# Patient Record
Sex: MALE | Race: WHITE | NOT HISPANIC OR LATINO | Employment: UNEMPLOYED | URBAN - METROPOLITAN AREA
[De-identification: names, ages, dates, MRNs, and addresses within clinical notes are randomized per-mention and may not be internally consistent; named-entity substitution may affect disease eponyms.]

---
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
## 2019-03-29 DIAGNOSIS — Z00.00 WELL ADULT EXAM: Primary | ICD-10-CM

## 2019-04-04 ENCOUNTER — RESULTS ENCOUNTER (OUTPATIENT)
Dept: FAMILY MEDICINE CLINIC | Facility: CLINIC | Age: 19
End: 2019-04-04

## 2019-04-04 DIAGNOSIS — Z00.00 WELL ADULT EXAM: ICD-10-CM

## 2019-04-06 LAB
25(OH)D3+25(OH)D2 SERPL-MCNC: 45.7 NG/ML (ref 30–100)
ALBUMIN SERPL-MCNC: 4.8 G/DL (ref 3.5–5.2)
ALBUMIN/GLOB SERPL: 2.5 G/DL
ALP SERPL-CCNC: 99 U/L (ref 39–117)
ALT SERPL-CCNC: 70 U/L (ref 1–41)
APPEARANCE UR: CLEAR
AST SERPL-CCNC: 32 U/L (ref 1–40)
BACTERIA #/AREA URNS HPF: NORMAL /HPF
BASOPHILS # BLD AUTO: 0.05 10*3/MM3 (ref 0–0.2)
BASOPHILS NFR BLD AUTO: 0.7 % (ref 0–1.5)
BILIRUB SERPL-MCNC: 0.7 MG/DL (ref 0.2–1.2)
BILIRUB UR QL STRIP: NEGATIVE
BUN SERPL-MCNC: 12 MG/DL (ref 6–20)
BUN/CREAT SERPL: 15 (ref 7–25)
CALCIUM SERPL-MCNC: 9.7 MG/DL (ref 8.6–10.5)
CHLORIDE SERPL-SCNC: 104 MMOL/L (ref 98–107)
CHOLEST SERPL-MCNC: 123 MG/DL (ref 0–200)
CO2 SERPL-SCNC: 26.8 MMOL/L (ref 22–29)
COLOR UR: YELLOW
CREAT SERPL-MCNC: 0.8 MG/DL (ref 0.76–1.27)
EOSINOPHIL # BLD AUTO: 0.58 10*3/MM3 (ref 0–0.4)
EOSINOPHIL NFR BLD AUTO: 8.5 % (ref 0.3–6.2)
EPI CELLS #/AREA URNS HPF: NORMAL /HPF
ERYTHROCYTE [DISTWIDTH] IN BLOOD BY AUTOMATED COUNT: 12.5 % (ref 12.3–15.4)
GLOBULIN SER CALC-MCNC: 1.9 GM/DL
GLUCOSE SERPL-MCNC: 87 MG/DL (ref 65–99)
GLUCOSE UR QL: NEGATIVE
HCT VFR BLD AUTO: 49.1 % (ref 37.5–51)
HDLC SERPL-MCNC: 42 MG/DL (ref 40–60)
HGB BLD-MCNC: 16.7 G/DL (ref 13–17.7)
HGB UR QL STRIP: NEGATIVE
IMM GRANULOCYTES # BLD AUTO: 0.08 10*3/MM3 (ref 0–0.05)
IMM GRANULOCYTES NFR BLD AUTO: 1.2 % (ref 0–0.5)
KETONES UR QL STRIP: NEGATIVE
LDLC SERPL CALC-MCNC: 54 MG/DL (ref 0–100)
LDLC/HDLC SERPL: 1.29 {RATIO}
LEUKOCYTE ESTERASE UR QL STRIP: NEGATIVE
LYMPHOCYTES # BLD AUTO: 2.36 10*3/MM3 (ref 0.7–3.1)
LYMPHOCYTES NFR BLD AUTO: 34.5 % (ref 19.6–45.3)
MCH RBC QN AUTO: 29.7 PG (ref 26.6–33)
MCHC RBC AUTO-ENTMCNC: 34 G/DL (ref 31.5–35.7)
MCV RBC AUTO: 87.2 FL (ref 79–97)
MICRO URNS: ABNORMAL
MICRO URNS: ABNORMAL
MONOCYTES # BLD AUTO: 0.68 10*3/MM3 (ref 0.1–0.9)
MONOCYTES NFR BLD AUTO: 9.9 % (ref 5–12)
MUCOUS THREADS URNS QL MICRO: PRESENT /HPF
NEUTROPHILS # BLD AUTO: 3.1 10*3/MM3 (ref 1.4–7)
NEUTROPHILS NFR BLD AUTO: 45.2 % (ref 42.7–76)
NITRITE UR QL STRIP: NEGATIVE
NRBC BLD AUTO-RTO: 0.1 /100 WBC (ref 0–0)
PH UR STRIP: 6.5 [PH] (ref 5–7.5)
PLATELET # BLD AUTO: 281 10*3/MM3 (ref 140–450)
POTASSIUM SERPL-SCNC: 4.4 MMOL/L (ref 3.5–5.2)
PROT SERPL-MCNC: 6.7 G/DL (ref 6–8.5)
PROT UR QL STRIP: NEGATIVE
RBC # BLD AUTO: 5.63 10*6/MM3 (ref 4.14–5.8)
RBC #/AREA URNS HPF: NORMAL /HPF
SODIUM SERPL-SCNC: 142 MMOL/L (ref 136–145)
SP GR UR: >=1.03 (ref 1–1.03)
T4 FREE SERPL-MCNC: 1.13 NG/DL (ref 0.93–1.7)
TRIGL SERPL-MCNC: 135 MG/DL (ref 0–150)
TSH SERPL DL<=0.005 MIU/L-ACNC: 0.7 MIU/ML (ref 0.27–4.2)
URINALYSIS REFLEX: ABNORMAL
UROBILINOGEN UR STRIP-MCNC: 0.2 MG/DL (ref 0.2–1)
VLDLC SERPL CALC-MCNC: 27 MG/DL
WBC # BLD AUTO: 6.85 10*3/MM3 (ref 3.4–10.8)
WBC #/AREA URNS HPF: NORMAL /HPF

## 2019-04-12 ENCOUNTER — OFFICE VISIT (OUTPATIENT)
Dept: FAMILY MEDICINE CLINIC | Facility: CLINIC | Age: 19
End: 2019-04-12

## 2019-04-12 VITALS
WEIGHT: 187.9 LBS | BODY MASS INDEX: 27.83 KG/M2 | TEMPERATURE: 97.9 F | DIASTOLIC BLOOD PRESSURE: 64 MMHG | SYSTOLIC BLOOD PRESSURE: 120 MMHG | HEIGHT: 69 IN | OXYGEN SATURATION: 98 % | HEART RATE: 87 BPM

## 2019-04-12 DIAGNOSIS — Z00.00 WELL ADULT EXAM: ICD-10-CM

## 2019-04-12 DIAGNOSIS — K21.9 GASTROESOPHAGEAL REFLUX DISEASE, ESOPHAGITIS PRESENCE NOT SPECIFIED: ICD-10-CM

## 2019-04-12 DIAGNOSIS — R74.8 ELEVATED LIVER ENZYMES: Primary | ICD-10-CM

## 2019-04-12 PROCEDURE — 99395 PREV VISIT EST AGE 18-39: CPT | Performed by: INTERNAL MEDICINE

## 2019-04-12 RX ORDER — RANITIDINE 150 MG/1
150 CAPSULE ORAL EVERY EVENING
Qty: 30 CAPSULE | Refills: 11 | Status: SHIPPED | OUTPATIENT
Start: 2019-04-12 | End: 2020-03-25

## 2019-04-12 RX ORDER — CETIRIZINE HYDROCHLORIDE 10 MG/1
10 TABLET ORAL DAILY
COMMUNITY
End: 2020-03-25

## 2019-04-12 NOTE — PROGRESS NOTES
Subjective   Rik Rubin is a 18 y.o. male who comes in today for   Chief Complaint   Patient presents with   • Annual Exam   .    History of Present Illness   Here for CPE with mom.  Taking omeprazole daily and without it has flares of GERD.  First started around age 11 and initially tried zantac but hasn't lately.  Sometimes foods flare him.  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  Taking zinc and copper supplement to keep him well and not sick.    Mom has him taking  multivitamin as well as 1200 international units of vitamin D3 per day.  He is not exercising.  He does occasionally walk with his mom.  The following portions of the patient's history were reviewed and updated as appropriate: allergies, current medications, past family history, past medical history, past social history, past surgical history and problem list.    Review of Systems   Constitutional: Negative.    Gastrointestinal: Negative for constipation and diarrhea.   Psychiatric/Behavioral: Negative.        Vitals:    04/12/19 1045   BP: 120/64   Pulse: 87   Temp: 97.9 °F (36.6 °C)   SpO2: 98%       Objective   Physical Exam   Constitutional: He is oriented to person, place, and time. He appears well-developed and well-nourished.   HENT:   Head: Normocephalic and atraumatic.   Right Ear: External ear normal.   Left Ear: External ear normal.   Mouth/Throat: Oropharynx is clear and moist.   Eyes: Conjunctivae are normal.   Neck: Neck supple.   Cardiovascular: Normal rate, regular rhythm and normal heart sounds.   No bruits   Pulmonary/Chest: Effort normal and breath sounds normal. No respiratory distress. He has no wheezes. He has no rales.   Abdominal: Soft. Bowel sounds are normal. He exhibits no distension and no mass. There is no tenderness.   Lymphadenopathy:     He has no cervical adenopathy.   Neurological: He is alert and oriented to person, place, and time.   Skin: Skin is warm. Capillary refill takes less than  2 seconds.   Psychiatric: He has a normal mood and affect. His behavior is normal. Judgment and thought content normal.   Nursing note and vitals reviewed.        Current Outpatient Medications:   •  cetirizine (zyrTEC) 10 MG tablet, Take 10 mg by mouth Daily., Disp: , Rfl:   •  Multiple Vitamins-Minerals (MULTIVITAMIN WITH MINERALS) tablet tablet, Take 1 tablet by mouth Daily., Disp: , Rfl:   •  Probiotic Product (PROBIOTIC + OMEGA-3 PO), Take  by mouth., Disp: , Rfl:   •  vitamin D (CHOLECALCIFEROL) 400 UNITS tablet, Take 400 Units by mouth 2 (Two) Times a Day., Disp: , Rfl:   •  ranitidine (ZANTAC) 150 MG capsule, Take 1 capsule by mouth Every Evening., Disp: 30 capsule, Rfl: 11    Assessment/Plan   Rik was seen today for annual exam.    Diagnoses and all orders for this visit:    Elevated liver enzymes  -     Comprehensive Metabolic Panel; Future    Well adult exam    Gastroesophageal reflux disease, esophagitis presence not specified    Other orders  -     ranitidine (ZANTAC) 150 MG capsule; Take 1 capsule by mouth Every Evening.    stop omeprazole and start zantac 150 mg qhs  Recheck CMP in 4 weeks to follow-up on the one elevated liver enzyme  I have asked him to stop his zinc combined with copper supplement based on his elevated liver enzyme.  He could take zinc that is within a multivitamin or a low dosing supplement itself.  We also discussed that her taking a daily probiotic is not necessary but it would not hurt him and is an  immune system booster.  Labs were reviewed with Rik as well as his mom in detail and of written copy was provided for them his vaccinations are up-to-date               I have asked for the patient to return to clinic in 12month(s).

## 2019-04-26 ENCOUNTER — TELEPHONE (OUTPATIENT)
Dept: FAMILY MEDICINE CLINIC | Facility: CLINIC | Age: 19
End: 2019-04-26

## 2019-04-26 DIAGNOSIS — N50.9 TESTICULAR ABNORMALITY: Primary | ICD-10-CM

## 2019-05-09 DIAGNOSIS — R74.8 ELEVATED LIVER ENZYMES: ICD-10-CM

## 2020-03-10 RX ORDER — ALBUTEROL SULFATE 90 UG/1
2 AEROSOL, METERED RESPIRATORY (INHALATION) EVERY 4 HOURS PRN
Qty: 18 G | Refills: 1 | Status: SHIPPED | OUTPATIENT
Start: 2020-03-10 | End: 2021-11-16 | Stop reason: SDUPTHER

## 2020-03-10 NOTE — TELEPHONE ENCOUNTER
We have never prescribed this for the patient last seen 04/20/19 Nxt appointment 04/22/20. Please advise

## 2020-03-10 NOTE — TELEPHONE ENCOUNTER
Patient's mother, Judy, called stating that the patient is in need of another inhaler. Ventolin (albuterol sulfate inhaler 90 MCG).     [facility] on [address] confirmed.     Patient call back: [phone]

## 2020-03-23 ENCOUNTER — TELEPHONE (OUTPATIENT)
Dept: FAMILY MEDICINE CLINIC | Facility: CLINIC | Age: 20
End: 2020-03-23

## 2020-03-23 NOTE — TELEPHONE ENCOUNTER
E visit would be helpful or a video ov even better.  Need to know more information about his cough?  Any other sx's? Productive or dry?  Fever or no fever?

## 2020-03-23 NOTE — TELEPHONE ENCOUNTER
PT MOTHER STATED THAT PT HAS HAS A COUGH SINCE EITHER FEB 24TH OR 25TH. PT MOTHER STATED THAT PT HAS ASTHMA AND MOTHER REQUESTED TO KNOW IF THERE WAS SOMETHING THAT COULD BE CALLED IN TO HELP REMEDY THIS. PT MOTHER REQUESTED TO KNOW IF AN ANTIBIOTIC NEEDS TO BE CALLED IN SINCE IT HAS BEEN SO LONG SINCE HIS COUGH STARTED.    PLEASE ADVISE [phone]

## 2020-03-24 ENCOUNTER — E-VISIT (OUTPATIENT)
Dept: FAMILY MEDICINE CLINIC | Facility: CLINIC | Age: 20
End: 2020-03-24

## 2020-03-24 DIAGNOSIS — R05.9 COUGH: Primary | ICD-10-CM

## 2020-03-24 NOTE — TELEPHONE ENCOUNTER
Mom is calling back today. Cough is very dry, it is a very deep cough like when someone has bronchitis. No fever as of right now but does have chills and heat sweats on and off at night.

## 2020-03-25 ENCOUNTER — TELEMEDICINE (OUTPATIENT)
Dept: FAMILY MEDICINE CLINIC | Facility: CLINIC | Age: 20
End: 2020-03-25

## 2020-03-25 DIAGNOSIS — J45.20 MILD INTERMITTENT REACTIVE AIRWAY DISEASE WITHOUT COMPLICATION: ICD-10-CM

## 2020-03-25 DIAGNOSIS — R05.9 COUGH: Primary | ICD-10-CM

## 2020-03-25 DIAGNOSIS — J06.9 ACUTE URI: ICD-10-CM

## 2020-03-25 PROCEDURE — 99213 OFFICE O/P EST LOW 20 MIN: CPT | Performed by: INTERNAL MEDICINE

## 2020-03-25 RX ORDER — LORATADINE 10 MG/1
2 TABLET ORAL DAILY
COMMUNITY
Start: 2020-03-14

## 2020-03-25 RX ORDER — ALBUTEROL SULFATE 0.63 MG/3ML
1 SOLUTION RESPIRATORY (INHALATION) EVERY 6 HOURS PRN
Qty: 3 ML | Refills: 2 | Status: SHIPPED | OUTPATIENT
Start: 2020-03-25

## 2020-03-25 RX ORDER — BENZONATATE 200 MG/1
200 CAPSULE ORAL 3 TIMES DAILY PRN
Qty: 45 CAPSULE | Refills: 0 | Status: SHIPPED | OUTPATIENT
Start: 2020-03-25 | End: 2020-07-28

## 2020-03-25 RX ORDER — AZITHROMYCIN 250 MG/1
TABLET, FILM COATED ORAL
Qty: 6 TABLET | Refills: 0 | Status: SHIPPED | OUTPATIENT
Start: 2020-03-25 | End: 2020-07-28

## 2020-03-25 RX ORDER — FLUTICASONE PROPIONATE 50 MCG
1 SPRAY, SUSPENSION (ML) NASAL DAILY
COMMUNITY
Start: 2020-03-14

## 2020-03-25 NOTE — PROGRESS NOTES
Subjective   Rik Rubin is a 19 y.o. male who comes in today for No chief complaint on file.  .    History of Present Illness   Patient o he is taking an allergy medication.  His cough seems to worsen once he is been up for a while.  He is wondering about a nebulizer treatment.  N for video visit.  He had a cough upper respiratory illness on February 24.  He seemed to get better but then worsened on March 14.  The following day he went to the [facility] and received Bromfed, steroid Dosepak, Tessalon Perles, and pro-air.  This Tessalon Perles seem to help him.  He remains with a cough which is mostly dry although he can feel some congestion in his nose and throat area.  No color to it.  No fever.  His cough is nonproductive.  He does have history of reactive airway disease and significant seasonal allergies for which she is seen an allergist on 2 different occasions.  The following portions of the patient's history were reviewed and updated as appropriate: allergies, current medications, past family history, past medical history, past social history, past surgical history and problem list.    Review of Systems    There were no vitals taken for this visit.    [facility] Fall Risk Assessment has not been completed.    PHQ-2/PHQ-9 Depression Screening 8/1/2017   Little interest or pleasure in doing things 0   Feeling down, depressed, or hopeless 0   Total Score 0       Objective   Physical Exam      Current Outpatient Medications:   •  albuterol (ACCUNEB) 0.63 MG/3ML nebulizer solution, Take 3 mL by nebulization Every 6 (Six) Hours As Needed for Wheezing. 1 month supply, Disp: 3 mL, Rfl: 2  •  albuterol sulfate  (90 Base) MCG/ACT inhaler, Inhale 2 puffs Every 4 (Four) Hours As Needed for Wheezing., Disp: 18 g, Rfl: 1  •  Ascorbic Acid Buffered (BUFFERED VITAMIN C) 1000 MG capsule, Take 1 tablet by mouth Daily., Disp: , Rfl:   •  azithromycin (Zithromax Z-Jim) 250 MG tablet, Take 2 tablets the first day, then 1  tablet daily for 4 days., Disp: 6 tablet, Rfl: 0  •  benzonatate (TESSALON) 200 MG capsule, Take 1 capsule by mouth 3 (Three) Times a Day As Needed for Cough., Disp: 45 capsule, Rfl: 0  •  Cholecalciferol (VITAMIN D3 PO), Take 1,400 Units by mouth Daily., Disp: , Rfl:   •  fluticasone (FLONASE) 50 MCG/ACT nasal spray, 1 spray by Each Nare route Daily., Disp: , Rfl:   •  fluticasone-salmeterol (Advair Diskus) 250-50 MCG/DOSE DISKUS, Inhale 1 puff 2 (Two) Times a Day., Disp: 60 each, Rfl: 1  •  loratadine (Claritin) 10 MG tablet, Take 2 tablets by mouth Daily., Disp: , Rfl:   •  OMEPRAZOLE PO, Take 20 mg by mouth Daily., Disp: , Rfl:   •  Zinc 22.5 MG tablet, Take 1 tablet by mouth Every Other Day., Disp: , Rfl:     Assessment/Plan   Diagnoses and all orders for this visit:    Cough    Acute URI    Mild intermittent reactive airway disease without complication    Other orders  -     azithromycin (Zithromax Z-Jim) 250 MG tablet; Take 2 tablets the first day, then 1 tablet daily for 4 days.  -     benzonatate (TESSALON) 200 MG capsule; Take 1 capsule by mouth 3 (Three) Times a Day As Needed for Cough.  -     albuterol (ACCUNEB) 0.63 MG/3ML nebulizer solution; Take 3 mL by nebulization Every 6 (Six) Hours As Needed for Wheezing. 1 month supply  -     fluticasone-salmeterol (Advair Diskus) 250-50 MCG/DOSE DISKUS; Inhale 1 puff 2 (Two) Times a Day.      Patient sounds like he is having a reactive airway disease flare from either a viral upper respiratory infection or potentially a secondary bacterial upper respiratory infection that followed his initial illness in late February.  I am going to go ahead and start Zithromax on him as this is worked very well for him in the past and he does have a penicillin sensitivity.  I have refilled his or reordered his Tessalon Perles which seem to be helping him.  We are going to supply him with a nebulizer and send the order to his insurance company.  For payment.  The nebulizer will  be to use only if needed and if he gets into a coughing spasm which tends to happen on occasion.  I am also going to start him on Advair Diskus 250/50 1 puff twice daily and he has to rinse his mouth out after each use.  His nebulizer or the pro-air inhaler will be used only as needed for rescue and he is not to combine the 2 unless he is in severe distress.  Mom and patient are aware of this.  Total time spent with patient was 15 minutes.             I have asked for the patient to return to clinic in 6day(s).

## 2020-03-25 NOTE — PROGRESS NOTES
Subjective   Rik Rubin is a 19 y.o. male.     History of Present Illness   Pt has a cough for a few weeks.  This needs to be a video visit encounter.  We are calling him and setting up telehealth visit.    The following portions of the patient's history were reviewed and updated as appropriate: allergies, current medications, past family history, past medical history, past social history, past surgical history and problem list.    Review of Systems    Objective   Physical Exam      Assessment/Plan   Diagnoses and all orders for this visit:    Cough

## 2020-07-28 ENCOUNTER — OFFICE VISIT (OUTPATIENT)
Dept: FAMILY MEDICINE CLINIC | Facility: CLINIC | Age: 20
End: 2020-07-28

## 2020-07-28 VITALS
HEIGHT: 68 IN | BODY MASS INDEX: 31.16 KG/M2 | WEIGHT: 205.6 LBS | HEART RATE: 95 BPM | DIASTOLIC BLOOD PRESSURE: 66 MMHG | SYSTOLIC BLOOD PRESSURE: 122 MMHG | OXYGEN SATURATION: 98 % | TEMPERATURE: 98.1 F | RESPIRATION RATE: 16 BRPM

## 2020-07-28 DIAGNOSIS — Z00.00 WELL ADULT EXAM: Primary | ICD-10-CM

## 2020-07-28 DIAGNOSIS — R74.8 ELEVATED LIVER ENZYMES: ICD-10-CM

## 2020-07-28 PROCEDURE — 99395 PREV VISIT EST AGE 18-39: CPT | Performed by: INTERNAL MEDICINE

## 2020-07-28 RX ORDER — NICOTINE POLACRILEX 4 MG/1
20 GUM, CHEWING ORAL DAILY
Qty: 90 EACH | Refills: 1 | Status: SHIPPED | OUTPATIENT
Start: 2020-07-28

## 2020-07-28 NOTE — PROGRESS NOTES
"Subjective   Rik Rubin is a 20 y.o. male who comes in today for   Chief Complaint   Patient presents with   • Annual Exam     cpe    .    History of Present Illness   Here for CPE.  He is finishing up an associates degree at [employer].  Living at home and not working currently.  Feeling well.  No concerns per patient.  No stomach issues.  No constipation.  No urinary issues.  No cp or soa.  Uses rescue inhaler prn which is rare.  He is swimming for exercise.  Sleeping well.   He is wanting a prescription for omeprazole to save money.  He takes it prn when he has GERD.    The following portions of the patient's history were reviewed and updated as appropriate: allergies, current medications, past family history, past medical history, past social history, past surgical history and problem list.    Review of Systems    /66   Pulse 95   Temp 98.1 °F (36.7 °C) (Temporal)   Resp 16   Ht 173.5 cm (68.31\")   Wt 93.3 kg (205 lb 9.6 oz)   SpO2 98%   BMI 30.98 kg/m²     [facility] Fall Risk Assessment has not been completed.    PHQ-2/PHQ-9 Depression Screening 7/28/2020   Little interest or pleasure in doing things 0   Feeling down, depressed, or hopeless 0   Total Score 0       Objective   Physical Exam   Constitutional: He is oriented to person, place, and time. He appears well-developed and well-nourished.   HENT:   Head: Normocephalic and atraumatic.   Eyes: Conjunctivae are normal.   Neck: Neck supple.   Cardiovascular: Normal rate, regular rhythm and normal heart sounds.   No bruits   Pulmonary/Chest: Effort normal and breath sounds normal. No respiratory distress. He has no wheezes. He has no rales.   Abdominal: Soft. Bowel sounds are normal. He exhibits no distension and no mass. There is no tenderness.   Lymphadenopathy:     He has no cervical adenopathy.   Neurological: He is alert and oriented to person, place, and time.   Skin: Skin is warm.   Psychiatric: He has a normal mood and affect. His behavior is " normal. Judgment and thought content normal.   Nursing note and vitals reviewed.        Current Outpatient Medications:   •  Ascorbic Acid Buffered (BUFFERED VITAMIN C) 1000 MG capsule, Take 1 tablet by mouth Daily., Disp: , Rfl:   •  Cholecalciferol (VITAMIN D3 PO), Take 1,400 Units by mouth Daily., Disp: , Rfl:   •  fluticasone (FLONASE) 50 MCG/ACT nasal spray, 1 spray by Each Nare route Daily., Disp: , Rfl:   •  loratadine (Claritin) 10 MG tablet, Take 2 tablets by mouth Daily., Disp: , Rfl:   •  Zinc 22.5 MG tablet, Take 1 tablet by mouth Every Other Day., Disp: , Rfl:   •  albuterol (ACCUNEB) 0.63 MG/3ML nebulizer solution, Take 3 mL by nebulization Every 6 (Six) Hours As Needed for Wheezing. 1 month supply, Disp: 3 mL, Rfl: 2  •  albuterol sulfate  (90 Base) MCG/ACT inhaler, Inhale 2 puffs Every 4 (Four) Hours As Needed for Wheezing., Disp: 18 g, Rfl: 1  •  Omeprazole 20 MG tablet delayed-release, Take 20 mg by mouth Daily., Disp: 90 each, Rfl: 1    Assessment/Plan   Rik was seen today for annual exam.    Diagnoses and all orders for this visit:    Well adult exam  -     CBC & Differential  -     Comprehensive Metabolic Panel  -     Lipid Panel With LDL / HDL Ratio  -     TSH  -     T4, Free  -     Urinalysis With Culture If Indicated -  -     Vitamin D 25 Hydroxy    Elevated liver enzymes  -     CBC & Differential  -     Comprehensive Metabolic Panel  -     Lipid Panel With LDL / HDL Ratio  -     TSH  -     T4, Free  -     Urinalysis With Culture If Indicated -  -     Vitamin D 25 Hydroxy    Other orders  -     Omeprazole 20 MG tablet delayed-release; Take 20 mg by mouth Daily.      Declines tetanus shot today  Fasting labs ordered  Exercise encouraged               I have asked for the patient to return to clinic in 12month(s).

## 2020-07-29 LAB
25(OH)D3+25(OH)D2 SERPL-MCNC: 59.7 NG/ML (ref 30–100)
ALBUMIN SERPL-MCNC: 5.1 G/DL (ref 3.5–5.2)
ALBUMIN/GLOB SERPL: 3 G/DL
ALP SERPL-CCNC: 91 U/L (ref 39–117)
ALT SERPL-CCNC: 94 U/L (ref 1–41)
APPEARANCE UR: CLEAR
AST SERPL-CCNC: 42 U/L (ref 1–40)
BACTERIA #/AREA URNS HPF: ABNORMAL /HPF
BASOPHILS # BLD AUTO: 0.04 10*3/MM3 (ref 0–0.2)
BASOPHILS NFR BLD AUTO: 0.5 % (ref 0–1.5)
BILIRUB SERPL-MCNC: 1 MG/DL (ref 0–1.2)
BILIRUB UR QL STRIP: NEGATIVE
BUN SERPL-MCNC: 11 MG/DL (ref 6–20)
BUN/CREAT SERPL: 12.5 (ref 7–25)
CALCIUM SERPL-MCNC: 9.7 MG/DL (ref 8.6–10.5)
CHLORIDE SERPL-SCNC: 100 MMOL/L (ref 98–107)
CHOLEST SERPL-MCNC: 145 MG/DL (ref 0–200)
CO2 SERPL-SCNC: 27.6 MMOL/L (ref 22–29)
COLOR UR: YELLOW
CREAT SERPL-MCNC: 0.88 MG/DL (ref 0.76–1.27)
CRYSTALS URNS MICRO: ABNORMAL
EOSINOPHIL # BLD AUTO: 0.27 10*3/MM3 (ref 0–0.4)
EOSINOPHIL NFR BLD AUTO: 3.2 % (ref 0.3–6.2)
EPI CELLS #/AREA URNS HPF: ABNORMAL /HPF (ref 0–10)
ERYTHROCYTE [DISTWIDTH] IN BLOOD BY AUTOMATED COUNT: 12.5 % (ref 12.3–15.4)
GLOBULIN SER CALC-MCNC: 1.7 GM/DL
GLUCOSE SERPL-MCNC: 85 MG/DL (ref 65–99)
GLUCOSE UR QL: NEGATIVE
HCT VFR BLD AUTO: 51.6 % (ref 37.5–51)
HDLC SERPL-MCNC: 43 MG/DL (ref 40–60)
HGB BLD-MCNC: 17.6 G/DL (ref 13–17.7)
HGB UR QL STRIP: NEGATIVE
IMM GRANULOCYTES # BLD AUTO: 0.07 10*3/MM3 (ref 0–0.05)
IMM GRANULOCYTES NFR BLD AUTO: 0.8 % (ref 0–0.5)
KETONES UR QL STRIP: NEGATIVE
LDLC SERPL CALC-MCNC: 79 MG/DL (ref 0–100)
LDLC/HDLC SERPL: 1.84 {RATIO}
LEUKOCYTE ESTERASE UR QL STRIP: NEGATIVE
LYMPHOCYTES # BLD AUTO: 2.82 10*3/MM3 (ref 0.7–3.1)
LYMPHOCYTES NFR BLD AUTO: 33.9 % (ref 19.6–45.3)
MCH RBC QN AUTO: 30.1 PG (ref 26.6–33)
MCHC RBC AUTO-ENTMCNC: 34.1 G/DL (ref 31.5–35.7)
MCV RBC AUTO: 88.2 FL (ref 79–97)
MICRO URNS: NORMAL
MICRO URNS: NORMAL
MONOCYTES # BLD AUTO: 0.84 10*3/MM3 (ref 0.1–0.9)
MONOCYTES NFR BLD AUTO: 10.1 % (ref 5–12)
MUCOUS THREADS URNS QL MICRO: PRESENT /HPF
NEUTROPHILS # BLD AUTO: 4.29 10*3/MM3 (ref 1.7–7)
NEUTROPHILS NFR BLD AUTO: 51.5 % (ref 42.7–76)
NITRITE UR QL STRIP: NEGATIVE
NRBC BLD AUTO-RTO: 0.1 /100 WBC (ref 0–0.2)
PH UR STRIP: 6 [PH] (ref 5–7.5)
PLATELET # BLD AUTO: 277 10*3/MM3 (ref 140–450)
POTASSIUM SERPL-SCNC: 4.5 MMOL/L (ref 3.5–5.2)
PROT SERPL-MCNC: 6.8 G/DL (ref 6–8.5)
PROT UR QL STRIP: NEGATIVE
RBC # BLD AUTO: 5.85 10*6/MM3 (ref 4.14–5.8)
RBC #/AREA URNS HPF: ABNORMAL /HPF (ref 0–2)
SODIUM SERPL-SCNC: 139 MMOL/L (ref 136–145)
SP GR UR: 1.03 (ref 1–1.03)
T4 FREE SERPL-MCNC: 1.32 NG/DL (ref 0.93–1.7)
TRIGL SERPL-MCNC: 114 MG/DL (ref 0–150)
TSH SERPL DL<=0.005 MIU/L-ACNC: 1 UIU/ML (ref 0.27–4.2)
UNIDENT CRYS URNS QL MICRO: PRESENT /LPF
URINALYSIS REFLEX: NORMAL
UROBILINOGEN UR STRIP-MCNC: 0.2 MG/DL (ref 0.2–1)
VLDLC SERPL CALC-MCNC: 22.8 MG/DL
WBC # BLD AUTO: 8.33 10*3/MM3 (ref 3.4–10.8)
WBC #/AREA URNS HPF: ABNORMAL /HPF (ref 0–5)

## 2020-07-31 ENCOUNTER — TELEPHONE (OUTPATIENT)
Dept: GASTROENTEROLOGY | Facility: CLINIC | Age: 20
End: 2020-07-31

## 2020-07-31 NOTE — TELEPHONE ENCOUNTER
Patient has a up coming a appt on 8/7/2020 with Lalitha , pt suffers from chronic anxiety and recently found out his ALT levels where elevated after having labs drawn by his PCP  and has many question . Mother is requesting you review patients labs before he is seen , and advise Lalitha that you have , so the day of the visit his anxiety does not get the best of him .    Ms Main [phone]

## 2020-08-14 ENCOUNTER — TELEMEDICINE (OUTPATIENT)
Dept: GASTROENTEROLOGY | Facility: CLINIC | Age: 20
End: 2020-08-14

## 2020-08-14 VITALS — WEIGHT: 205 LBS | BODY MASS INDEX: 30.89 KG/M2

## 2020-08-14 DIAGNOSIS — K21.9 GASTROESOPHAGEAL REFLUX DISEASE, ESOPHAGITIS PRESENCE NOT SPECIFIED: ICD-10-CM

## 2020-08-14 DIAGNOSIS — R13.10 DYSPHAGIA, UNSPECIFIED TYPE: ICD-10-CM

## 2020-08-14 DIAGNOSIS — R74.8 ELEVATED LIVER ENZYMES: Primary | ICD-10-CM

## 2020-08-14 PROCEDURE — 99214 OFFICE O/P EST MOD 30 MIN: CPT | Performed by: NURSE PRACTITIONER

## 2020-08-14 NOTE — PROGRESS NOTES
Chief Complaint   Patient presents with   • Elevated Hepatic Enzymes       HPI  20-year-old male presents today for telehealth office visi.  He was referred by his PCP, Emily Wright for elevated liver enzymes.  His mother was also present today during his video visit to assist in the answering of questions.  He was a patient in our previous practice.    Lab work performed on 7/28/2020 demonstrated an AST of 42 and ALT of 94.  No other abnormalities were noted in his hepatic function panel.  He denies having any right upper quadrant abdominal pain, dark urine, jaundice of the skin or sclera, or pruritus.  He denies use of Tylenol.  He denies having any tattoos.  He is a non-smoker and does not drink alcohol.  He reports taking vitamin D3 and zinc supplements, as he states that these both work well to help manage his anxiety.  He denies any family history of liver disease, aside from fatty liver in his father.  He still has his gallbladder.    He has a history of GERD and takes omeprazole 20 mg once daily.  Symptoms are well controlled as long as he takes his medication.  His last EGD was performed on 5/2/2018 and demonstrated a small hiatal hernia, a ringed appearance of the esophagus, a non-consistent appearance of a small ring would come and go with insufflation and deflation; schatzki's ring was not persistent.  Gastritis also noted.    He reports having a history of dysphasia.  Symptoms occur approximately 1-2 times per month.  He reports that food seems to get caught midway down his esophagus and seems to be most prevalent when his reflux is acting up.  Chicken and broccoli seem to worsen symptoms.  At times he has to regurgitate his food, but does not report any true nausea or vomiting.    He reports having regular daily bowel movements and denies having any abdominal pain, diarrhea, constipation, melena, or hematochezia.  He reports having a good appetite and states his weight is stable.    You have chosen to  receive care through a telehealth visit.  Do you consent to use a video/audio connection for your medical care today? Yes      Review of Systems   Constitutional: Negative for chills, fatigue and fever.   Eyes: Negative.    Respiratory: Negative for cough, choking, chest tightness and shortness of breath.    Cardiovascular: Negative for chest pain.       Problem List:    Patient Active Problem List   Diagnosis   • Chest wall asymmetry   • Dyspnea   • Scoliosis of lumbar spine   • Well adult exam   • Elevated liver enzymes       Medical History:    Past Medical History:   Diagnosis Date   • Allergic    • Anxiety     for past 4 years   • Depression    • GERD (gastroesophageal reflux disease)    • OCD (obsessive compulsive disorder)         Social History:    Social History     Socioeconomic History   • Marital status: Single     Spouse name: Not on file   • Number of children: Not on file   • Years of education: Not on file   • Highest education level: Not on file   Tobacco Use   • Smoking status: Never Smoker   • Smokeless tobacco: Never Used   Substance and Sexual Activity   • Alcohol use: No   • Drug use: No   • Sexual activity: Defer       Family History: History reviewed. No pertinent family history.    Surgical History:   Past Surgical History:   Procedure Laterality Date   • CIRCUMCISION           Current Outpatient Medications:   •  albuterol (ACCUNEB) 0.63 MG/3ML nebulizer solution, Take 3 mL by nebulization Every 6 (Six) Hours As Needed for Wheezing. 1 month supply, Disp: 3 mL, Rfl: 2  •  albuterol sulfate  (90 Base) MCG/ACT inhaler, Inhale 2 puffs Every 4 (Four) Hours As Needed for Wheezing., Disp: 18 g, Rfl: 1  •  Omeprazole 20 MG tablet delayed-release, Take 20 mg by mouth Daily. (Patient taking differently: Take 20 mg by mouth Daily As Needed.), Disp: 90 each, Rfl: 1  •  Zinc 22.5 MG tablet, Take 1 tablet by mouth Every Other Day., Disp: , Rfl:   •  Ascorbic Acid Buffered (BUFFERED VITAMIN C) 1000  MG capsule, Take 1 tablet by mouth Daily., Disp: , Rfl:   •  Cholecalciferol (VITAMIN D3 PO), Take 1,400 Units by mouth Daily., Disp: , Rfl:   •  fluticasone (FLONASE) 50 MCG/ACT nasal spray, 1 spray by Each Nare route Daily., Disp: , Rfl:   •  loratadine (Claritin) 10 MG tablet, Take 2 tablets by mouth Daily., Disp: , Rfl:     Allergies:  Fish-derived products; Peanut-containing drug products; and Augmentin [amoxicillin-pot clavulanate]    The following portions of the patient's history were reviewed and updated as appropriate: allergies, current medications, past family history, past medical history, past social history, past surgical history and problem list.    Physical Exam   Constitutional: He is oriented to person, place, and time. He appears well-developed and well-nourished. No distress.   Pulmonary/Chest: No respiratory distress.   Neurological: He is alert and oriented to person, place, and time.   Skin: No pallor.   Patient does not appear to be jaundiced   Psychiatric: He has a normal mood and affect. His behavior is normal. Judgment and thought content normal.       Assessment/ Plan  Rik was seen today for elevated hepatic enzymes.    Diagnoses and all orders for this visit:    Elevated liver enzymes  -     Alpha - 1 - Antitrypsin  -     JESUS  -     Anti-microsomal Antibody  -     Anti-Smooth Muscle Antibody Titer  -     CBC & Differential  -     Comprehensive Metabolic Panel  -     Ferritin  -     Celiac Disease Panel  -     Ceruloplasmin  -     Gamma GT  -     Hepatitis Panel, Acute  -     IgG, IgA, IgM  -     Iron Profile  -     Mitochondrial Antibodies, M2  -     US Abdomen Limited; Future    Dysphagia, unspecified type    Gastroesophageal reflux disease, esophagitis presence not specified         Return in about 3 weeks (around 9/4/2020) for Follow-up in 3 weeks to discuss lab results and imaging as well as plan of care moving forward.    Patient Instructions   1.  For further evaluation of  elevated transaminases we will proceed with full liver lab work-up as well as a right upper quadrant ultrasound.    2. Additional recommendations pending outcome of lab work and imaging.    3.  In regards to her dysphasia, we will reassess the symptom at your follow-up in 3 weeks to determine plan of care moving forward.    4.  For GERD, continue omeprazole 20 mg once daily.      Discussion:  We will proceed with full liver lab work-up and right upper quadrant ultrasound for further evaluation of the patient's elevated liver enzymes.  At this time it is unclear as to the etiology behind his conditions as it it does not appear that he is taking any medications or supplements that could contribute to his transaminase elevations.  Suspect fatty liver.    At this time, the patient would like to defer any further work-up for his dysphasia and states that he would like to revisit this symptom at his upcoming telemedicine visit in 3 weeks.  Patient may require EGD with dilation in the future for management of symptoms.  Patient verbalized understanding of above.  All questions answered and support provided.    This visit was completed as a telemedicine video visit due to COVID-19 pandemic.

## 2020-08-14 NOTE — PATIENT INSTRUCTIONS
1.  For further evaluation of elevated transaminases we will proceed with full liver lab work-up as well as a right upper quadrant ultrasound.    2. Additional recommendations pending outcome of lab work and imaging.    3.  In regards to her dysphasia, we will reassess the symptom at your follow-up in 3 weeks to determine plan of care moving forward.    4.  For GERD, continue omeprazole 20 mg once daily.

## 2020-08-19 ENCOUNTER — TELEPHONE (OUTPATIENT)
Dept: GASTROENTEROLOGY | Facility: CLINIC | Age: 20
End: 2020-08-19

## 2020-08-19 NOTE — TELEPHONE ENCOUNTER
Pt mother, Judy, called and would like to have pt's ultrasound scheduled at [facility]. Please contact Judy with any updates or requirements.

## 2020-08-28 DIAGNOSIS — R74.8 ELEVATED LIVER ENZYMES: ICD-10-CM

## 2020-08-31 LAB
A1AT SERPL-MCNC: 125 MG/DL (ref 95–164)
ACTIN IGG SERPL-ACNC: 6 UNITS (ref 0–19)
ALBUMIN SERPL-MCNC: 4.7 G/DL (ref 4.1–5.2)
ALBUMIN/GLOB SERPL: 2.5 {RATIO} (ref 1.2–2.2)
ALP SERPL-CCNC: 99 IU/L (ref 39–117)
ALT SERPL-CCNC: 67 IU/L (ref 0–44)
ANA SER QL: NEGATIVE
AST SERPL-CCNC: 28 IU/L (ref 0–40)
BASOPHILS # BLD AUTO: 0.1 X10E3/UL (ref 0–0.2)
BASOPHILS NFR BLD AUTO: 1 %
BILIRUB SERPL-MCNC: 0.8 MG/DL (ref 0–1.2)
BUN SERPL-MCNC: 11 MG/DL (ref 6–20)
BUN/CREAT SERPL: 12 (ref 9–20)
CALCIUM SERPL-MCNC: 9.7 MG/DL (ref 8.7–10.2)
CERULOPLASMIN SERPL-MCNC: 18.2 MG/DL (ref 16–31)
CHLORIDE SERPL-SCNC: 102 MMOL/L (ref 96–106)
CO2 SERPL-SCNC: 26 MMOL/L (ref 20–29)
CREAT SERPL-MCNC: 0.92 MG/DL (ref 0.76–1.27)
ENDOMYSIUM IGA SER QL: NEGATIVE
EOSINOPHIL # BLD AUTO: 0.7 X10E3/UL (ref 0–0.4)
EOSINOPHIL NFR BLD AUTO: 8 %
ERYTHROCYTE [DISTWIDTH] IN BLOOD BY AUTOMATED COUNT: 12.4 % (ref 11.6–15.4)
FERRITIN SERPL-MCNC: 86 NG/ML (ref 30–400)
GGT SERPL-CCNC: 31 IU/L (ref 0–65)
GLOBULIN SER CALC-MCNC: 1.9 G/DL (ref 1.5–4.5)
GLUCOSE SERPL-MCNC: 80 MG/DL (ref 65–99)
HAV IGM SERPL QL IA: NEGATIVE
HBV CORE IGM SERPL QL IA: NEGATIVE
HBV SURFACE AG SERPL QL IA: NEGATIVE
HCT VFR BLD AUTO: 50.6 % (ref 37.5–51)
HCV AB S/CO SERPL IA: <0.1 S/CO RATIO (ref 0–0.9)
HGB BLD-MCNC: 17.1 G/DL (ref 13–17.7)
IGA SERPL-MCNC: 99 MG/DL (ref 90–386)
IGG SERPL-MCNC: 732 MG/DL (ref 603–1613)
IGM SERPL-MCNC: 22 MG/DL (ref 20–172)
IMM GRANULOCYTES # BLD AUTO: 0 X10E3/UL (ref 0–0.1)
IMM GRANULOCYTES NFR BLD AUTO: 0 %
IRON SATN MFR SERPL: 25 % (ref 15–55)
IRON SERPL-MCNC: 99 UG/DL (ref 38–169)
LKM-1 AB SER-ACNC: 1.5 UNITS (ref 0–20)
LYMPHOCYTES # BLD AUTO: 3.1 X10E3/UL (ref 0.7–3.1)
LYMPHOCYTES NFR BLD AUTO: 34 %
MCH RBC QN AUTO: 29.8 PG (ref 26.6–33)
MCHC RBC AUTO-ENTMCNC: 33.8 G/DL (ref 31.5–35.7)
MCV RBC AUTO: 88 FL (ref 79–97)
MITOCHONDRIA M2 IGG SER-ACNC: <20 UNITS (ref 0–20)
MONOCYTES # BLD AUTO: 0.8 X10E3/UL (ref 0.1–0.9)
MONOCYTES NFR BLD AUTO: 8 %
NEUTROPHILS # BLD AUTO: 4.4 X10E3/UL (ref 1.4–7)
NEUTROPHILS NFR BLD AUTO: 49 %
PLATELET # BLD AUTO: 275 X10E3/UL (ref 150–450)
POTASSIUM SERPL-SCNC: 4.4 MMOL/L (ref 3.5–5.2)
PROT SERPL-MCNC: 6.6 G/DL (ref 6–8.5)
RBC # BLD AUTO: 5.74 X10E6/UL (ref 4.14–5.8)
SODIUM SERPL-SCNC: 143 MMOL/L (ref 134–144)
TIBC SERPL-MCNC: 396 UG/DL (ref 250–450)
TTG IGA SER-ACNC: <2 U/ML (ref 0–3)
UIBC SERPL-MCNC: 297 UG/DL (ref 111–343)
WBC # BLD AUTO: 9 X10E3/UL (ref 3.4–10.8)

## 2020-09-08 ENCOUNTER — TELEMEDICINE (OUTPATIENT)
Dept: GASTROENTEROLOGY | Facility: CLINIC | Age: 20
End: 2020-09-08

## 2020-09-08 DIAGNOSIS — K21.9 GASTROESOPHAGEAL REFLUX DISEASE, ESOPHAGITIS PRESENCE NOT SPECIFIED: ICD-10-CM

## 2020-09-08 DIAGNOSIS — K76.0 FATTY LIVER: Primary | ICD-10-CM

## 2020-09-08 PROCEDURE — 99214 OFFICE O/P EST MOD 30 MIN: CPT | Performed by: NURSE PRACTITIONER

## 2020-09-08 NOTE — PROGRESS NOTES
No chief complaint on file.      HPI  20-year-old male presents today for telemedicine visit.  His mother has accompanied him today on his telemedicine visit.  He was last seen via telemedicine visit on 8/14/2020.  He has a history of elevated liver enzymes, dysphagia, and GERD.    His last EGD was performed on 5/2/2020 revealing a small hiatal hernia, a ringed appearance of the esophagus, a non-consistent appearance of a small ring that came and went with insufflation and deflation-Schatzki's ring was not present, and gastritis.  Reports that heartburn and reflux symptoms are well managed with use of omeprazole 20 mg on an as-needed basis only.  He denies any nausea or vomiting.  He denies having any further issues with dysphasia and is able to swallow foods and liquids without difficulty.    Right upper quadrant ultrasound performed on 8/14/2020 demonstrates fatty liver, no other abnormality noted.  Liver lab work-up was unremarkable.  He denies have any right upper quadrant abdominal pain or jaundice.  He reports that his weight is stable, and does not report any weight loss or gain.  His mother had several questions in regards to the use of vitamin E in the setting of fatty liver, which was discussed in detai  The patient and his mother report that they plan to start a regular exercise routine 3 to 4 days/week and have eliminated sugar from his diet.  Etiology of fatty liver was reviewed with patient and his mother in detail as well as the importance of weight loss, regular exercise, and dietary modifications avoiding foods that are high in fat and high fructose corn syrup.    He reports having regular daily bowel movements and denies having any diarrhea, constipation, melena, or hematochezia.    Greater than 25 minutes of face-to-face time was spent with the patient today during his telemedicine visit, with more than 50% of the time spent in counseling and coordination of care.    You have chosen to receive  care through a telehealth visit.  Do you consent to use a video/audio connection for your medical care today? Yes      Review of Systems   Constitutional: Negative for fatigue and fever.   HENT: Negative for postnasal drip.    Eyes: Negative for pain.   Respiratory: Negative for cough and shortness of breath.    Cardiovascular: Negative for chest pain.   Gastrointestinal: Negative for abdominal pain, constipation, diarrhea, nausea and vomiting.   Genitourinary: Negative for difficulty urinating.   Musculoskeletal: Negative for arthralgias and myalgias.   Skin: Negative for rash.   Allergic/Immunologic: Positive for environmental allergies.   Neurological: Negative for dizziness, speech difficulty and weakness.   Hematological: Does not bruise/bleed easily.       Problem List:    Patient Active Problem List   Diagnosis   • Chest wall asymmetry   • Dyspnea   • Scoliosis of lumbar spine   • Well adult exam   • Elevated liver enzymes       Medical History:    Past Medical History:   Diagnosis Date   • Allergic    • Anxiety     for past 4 years   • Depression    • GERD (gastroesophageal reflux disease)    • OCD (obsessive compulsive disorder)         Social History:    Social History     Socioeconomic History   • Marital status: Single     Spouse name: Not on file   • Number of children: Not on file   • Years of education: Not on file   • Highest education level: Not on file   Tobacco Use   • Smoking status: Never Smoker   • Smokeless tobacco: Never Used   Substance and Sexual Activity   • Alcohol use: No   • Drug use: No   • Sexual activity: Defer       Family History: History reviewed. No pertinent family history.    Surgical History:   Past Surgical History:   Procedure Laterality Date   • CIRCUMCISION           Current Outpatient Medications:   •  albuterol (ACCUNEB) 0.63 MG/3ML nebulizer solution, Take 3 mL by nebulization Every 6 (Six) Hours As Needed for Wheezing. 1 month supply, Disp: 3 mL, Rfl: 2  •  albuterol  sulfate  (90 Base) MCG/ACT inhaler, Inhale 2 puffs Every 4 (Four) Hours As Needed for Wheezing., Disp: 18 g, Rfl: 1  •  Cholecalciferol (VITAMIN D3 PO), Take 1,400 Units by mouth Daily., Disp: , Rfl:   •  fluticasone (FLONASE) 50 MCG/ACT nasal spray, 1 spray by Each Nare route Daily., Disp: , Rfl:   •  loratadine (Claritin) 10 MG tablet, Take 2 tablets by mouth Daily., Disp: , Rfl:   •  Omeprazole 20 MG tablet delayed-release, Take 20 mg by mouth Daily. (Patient taking differently: Take 20 mg by mouth Daily As Needed.), Disp: 90 each, Rfl: 1  •  Zinc 22.5 MG tablet, Take 1 tablet by mouth Every Other Day., Disp: , Rfl:   •  Ascorbic Acid Buffered (BUFFERED VITAMIN C) 1000 MG capsule, Take 1 tablet by mouth Daily., Disp: , Rfl:     Allergies:  Fish-derived products; Peanut-containing drug products; and Augmentin [amoxicillin-pot clavulanate]    The following portions of the patient's history were reviewed and updated as appropriate: allergies, current medications, past family history, past medical history, past social history, past surgical history and problem list.    Physical Exam   Constitutional: He is oriented to person, place, and time. He appears well-developed and well-nourished. No distress.   Pulmonary/Chest: No respiratory distress.   Neurological: He is alert and oriented to person, place, and time.   Skin: No pallor.   Psychiatric: He has a normal mood and affect. His behavior is normal. Judgment and thought content normal.       Assessment/ Plan  Diagnoses and all orders for this visit:    Fatty liver    Gastroesophageal reflux disease, esophagitis presence not specified         Return in about 6 months (around 3/8/2021).    Patient Instructions   1.  For new diagnosis of fatty liver, the recommended treatment is weight loss along with regular exercise, avoidance of foods containing high fructose corn syrup, and alcohol avoidance.    2. Additionally for fatty liver, we recommend daily use of milk  thistle.  This product is available over-the-counter to local grocery or pharmacy and helps to provide antioxidant support to the liver.  We recommend 1 tablet daily.    3.  We will plan to recheck liver enzymes again in 6 months for reassessment as well as conduct office follow-up for reassessment.    4.  For GERD, you may continue to utilize omeprazole 20 mg as needed for symptom management.    Discussion:  Lengthy discussion was had with the patient and his mother today during his telemedicine visit regarding ongoing management and treatment of fatty liver, which was noted on ultrasound.  Patient's mother inquired about use of vitamin E in the management of fatty liver.  Patient has not undergone a FibroScan and does not have a diagnosis of Salgado.  At this point in time, treatment is aimed towards dietary modifications as well as weight loss and regular exercise.  Patient and his mother stated that they plan to begin an exercise program at least 3 to 4 days/week.  Patient has also agreed to incorporate milk thistle into his daily regimen.    For GERD, patient to continue omeprazole 20 mg on an as-needed basis and implement antireflux precautions.  We will plan for next telemedicine office visit in 6 months for reassessment and will also plan to recheck liver enzymes at that time as well.  Patient and his mother both verbalized understanding of above.  All questions answered and support provided.    This visit was completed as a telemedicine video visit due to COVID-19 pandemic.

## 2020-09-08 NOTE — PATIENT INSTRUCTIONS
1.  For new diagnosis of fatty liver, the recommended treatment is weight loss along with regular exercise, avoidance of foods containing high fructose corn syrup, and alcohol avoidance.    2. Additionally for fatty liver, we recommend daily use of milk thistle.  This product is available over-the-counter to local grocery or pharmacy and helps to provide antioxidant support to the liver.  We recommend 1 tablet daily.    3.  We will plan to recheck liver enzymes again in 6 months for reassessment as well as conduct office follow-up for reassessment.    4.  For GERD, you may continue to utilize omeprazole 20 mg as needed for symptom management.

## 2021-07-19 DIAGNOSIS — Z00.00 WELL ADULT EXAM: Primary | ICD-10-CM

## 2021-07-30 ENCOUNTER — OFFICE VISIT (OUTPATIENT)
Dept: FAMILY MEDICINE CLINIC | Facility: CLINIC | Age: 21
End: 2021-07-30

## 2021-07-30 VITALS
OXYGEN SATURATION: 98 % | HEIGHT: 69 IN | DIASTOLIC BLOOD PRESSURE: 76 MMHG | RESPIRATION RATE: 16 BRPM | TEMPERATURE: 97.1 F | SYSTOLIC BLOOD PRESSURE: 126 MMHG | BODY MASS INDEX: 32.73 KG/M2 | WEIGHT: 221 LBS | HEART RATE: 81 BPM

## 2021-07-30 DIAGNOSIS — Z00.00 WELL ADULT EXAM: Primary | ICD-10-CM

## 2021-07-30 DIAGNOSIS — E55.9 VITAMIN D DEFICIENCY: ICD-10-CM

## 2021-07-30 DIAGNOSIS — E04.9 ENLARGED THYROID: ICD-10-CM

## 2021-07-30 DIAGNOSIS — R74.8 ELEVATED LIVER ENZYMES: ICD-10-CM

## 2021-07-30 DIAGNOSIS — R05.9 COUGH: ICD-10-CM

## 2021-07-30 PROCEDURE — 99395 PREV VISIT EST AGE 18-39: CPT | Performed by: INTERNAL MEDICINE

## 2021-07-30 NOTE — PROGRESS NOTES
"Chief Complaint  Annual Exam (cpe )    Subjective          Rik Rubin presents to [facility]  History of Present Illness  Here for CPE.  Doing ok.  This past year, found that he has lactose intolerance.  Lactose triggers coughing and he can use inhaler or take a benadryl, and it helps stop the cough.  Also eating an icee will stop the coughing/tickling sensation in back of throat.  Taking omeprazole for GERD which helps control his reflux.  Also 3 dogs are triggers for his allergies.  Cough resolved when he went to [place] and stayed with his grandmother for 3 weeks.  He lives with his parents and siblings in their home with 10 indoor dogs.    Objective   Vital Signs:   /76   Pulse 81   Temp 97.1 °F (36.2 °C) (Temporal)   Resp 16   Ht 175 cm (68.9\")   Wt 100 kg (221 lb)   SpO2 98%   BMI 32.73 kg/m²     Physical Exam  Vitals and nursing note reviewed.   Constitutional:       Appearance: Normal appearance. He is well-developed.   HENT:      Head: Normocephalic and atraumatic.      Right Ear: External ear normal.      Left Ear: External ear normal.   Eyes:      Extraocular Movements: Extraocular movements intact.      Conjunctiva/sclera: Conjunctivae normal.   Neck:      Vascular: No carotid bruit.   Cardiovascular:      Rate and Rhythm: Normal rate and regular rhythm.      Heart sounds: Normal heart sounds.      Comments: No bruits  Pulmonary:      Effort: Pulmonary effort is normal. No respiratory distress.      Breath sounds: Normal breath sounds. No wheezing or rales.   Abdominal:      General: Bowel sounds are normal. There is no distension.      Palpations: Abdomen is soft. There is no mass.      Tenderness: There is no abdominal tenderness.   Musculoskeletal:      Cervical back: Neck supple.   Lymphadenopathy:      Cervical: No cervical adenopathy.   Skin:     General: Skin is warm.   Neurological:      General: No focal deficit present.      Mental Status: He is alert " and oriented to person, place, and time.   Psychiatric:         Mood and Affect: Mood normal.         Behavior: Behavior normal.         Thought Content: Thought content normal.         Judgment: Judgment normal.        Result Review :                 Assessment and Plan    Diagnoses and all orders for this visit:    1. Well adult exam (Primary)  -     CBC & Differential  -     Comprehensive Metabolic Panel  -     Lipid Panel With LDL / HDL Ratio  -     TSH  -     T4, Free  -     Vitamin D 25 Hydroxy  -     Urinalysis With Culture If Indicated -    2. Cough  -     US Thyroid; Future    3. Enlarged thyroid  -     US Thyroid; Future    4. Elevated liver enzymes  -     Comprehensive Metabolic Panel    5. Vitamin D deficiency  -     Vitamin D 25 Hydroxy    Other orders  -     fluticasone-salmeterol (Advair Diskus) 250-50 MCG/DOSE DISKUS; Inhale 1 puff 2 (Two) Times a Day.  Dispense: 60 each; Refill: 3        Follow Up   Return in about 1 year (around 7/30/2022).  Patient was given instructions and counseling regarding his condition or for health maintenance advice. Please see specific information pulled into the AVS if appropriate.     Dry intermittent cough--check thyroid u/s and start advair 250/50 one puff bid (rinse afterward )and prn albuterol. Mom will consider taking him to Dr. Hill for allergy evaluation.  Indoor pets and food/smells can be triggers for RAD which he has had since childhood  Continue omeprazole for GERD which also seems to be a trigger for his cough.  Fasting labs ordered today  Immunizations reviewed and declined today  Continue 2000 IUs/day of D3  Preventative counseling provided today

## 2021-07-31 LAB
25(OH)D3+25(OH)D2 SERPL-MCNC: 66.3 NG/ML (ref 30–100)
ALBUMIN SERPL-MCNC: 4.6 G/DL (ref 3.5–5.2)
ALBUMIN/GLOB SERPL: 2.2 G/DL
ALP SERPL-CCNC: 105 U/L (ref 39–117)
ALT SERPL-CCNC: 73 U/L (ref 1–41)
APPEARANCE UR: CLEAR
AST SERPL-CCNC: 32 U/L (ref 1–40)
BACTERIA #/AREA URNS HPF: NORMAL /HPF
BASOPHILS # BLD AUTO: 0.04 10*3/MM3 (ref 0–0.2)
BASOPHILS NFR BLD AUTO: 0.5 % (ref 0–1.5)
BILIRUB SERPL-MCNC: 0.7 MG/DL (ref 0–1.2)
BILIRUB UR QL STRIP: NEGATIVE
BUN SERPL-MCNC: 9 MG/DL (ref 6–20)
BUN/CREAT SERPL: 9.9 (ref 7–25)
CALCIUM SERPL-MCNC: 9.9 MG/DL (ref 8.6–10.5)
CASTS URNS QL MICRO: NORMAL /LPF
CHLORIDE SERPL-SCNC: 102 MMOL/L (ref 98–107)
CHOLEST SERPL-MCNC: 143 MG/DL (ref 0–200)
CO2 SERPL-SCNC: 28 MMOL/L (ref 22–29)
COLOR UR: YELLOW
CREAT SERPL-MCNC: 0.91 MG/DL (ref 0.76–1.27)
EOSINOPHIL # BLD AUTO: 0.55 10*3/MM3 (ref 0–0.4)
EOSINOPHIL NFR BLD AUTO: 6.5 % (ref 0.3–6.2)
EPI CELLS #/AREA URNS HPF: NORMAL /HPF (ref 0–10)
ERYTHROCYTE [DISTWIDTH] IN BLOOD BY AUTOMATED COUNT: 12.7 % (ref 12.3–15.4)
GLOBULIN SER CALC-MCNC: 2.1 GM/DL
GLUCOSE SERPL-MCNC: 79 MG/DL (ref 65–99)
GLUCOSE UR QL: NEGATIVE
HCT VFR BLD AUTO: 49.8 % (ref 37.5–51)
HDLC SERPL-MCNC: 39 MG/DL (ref 40–60)
HGB BLD-MCNC: 16.7 G/DL (ref 13–17.7)
HGB UR QL STRIP: NEGATIVE
IMM GRANULOCYTES # BLD AUTO: 0.11 10*3/MM3 (ref 0–0.05)
IMM GRANULOCYTES NFR BLD AUTO: 1.3 % (ref 0–0.5)
KETONES UR QL STRIP: NEGATIVE
LDLC SERPL CALC-MCNC: 75 MG/DL (ref 0–100)
LDLC/HDLC SERPL: 1.79 {RATIO}
LEUKOCYTE ESTERASE UR QL STRIP: NEGATIVE
LYMPHOCYTES # BLD AUTO: 3.3 10*3/MM3 (ref 0.7–3.1)
LYMPHOCYTES NFR BLD AUTO: 39 % (ref 19.6–45.3)
MCH RBC QN AUTO: 29.7 PG (ref 26.6–33)
MCHC RBC AUTO-ENTMCNC: 33.5 G/DL (ref 31.5–35.7)
MCV RBC AUTO: 88.5 FL (ref 79–97)
MICRO URNS: NORMAL
MICRO URNS: NORMAL
MONOCYTES # BLD AUTO: 0.73 10*3/MM3 (ref 0.1–0.9)
MONOCYTES NFR BLD AUTO: 8.6 % (ref 5–12)
NEUTROPHILS # BLD AUTO: 3.73 10*3/MM3 (ref 1.7–7)
NEUTROPHILS NFR BLD AUTO: 44.1 % (ref 42.7–76)
NITRITE UR QL STRIP: NEGATIVE
NRBC BLD AUTO-RTO: 0 /100 WBC (ref 0–0.2)
PH UR STRIP: 6 [PH] (ref 5–7.5)
PLATELET # BLD AUTO: 288 10*3/MM3 (ref 140–450)
POTASSIUM SERPL-SCNC: 4.8 MMOL/L (ref 3.5–5.2)
PROT SERPL-MCNC: 6.7 G/DL (ref 6–8.5)
PROT UR QL STRIP: NEGATIVE
RBC # BLD AUTO: 5.63 10*6/MM3 (ref 4.14–5.8)
RBC #/AREA URNS HPF: NORMAL /HPF (ref 0–2)
SODIUM SERPL-SCNC: 142 MMOL/L (ref 136–145)
SP GR UR: 1.03 (ref 1–1.03)
T4 FREE SERPL-MCNC: 1.15 NG/DL (ref 0.93–1.7)
TRIGL SERPL-MCNC: 170 MG/DL (ref 0–150)
TSH SERPL DL<=0.005 MIU/L-ACNC: 1.17 UIU/ML (ref 0.27–4.2)
URINALYSIS REFLEX: NORMAL
UROBILINOGEN UR STRIP-MCNC: 0.2 MG/DL (ref 0.2–1)
VLDLC SERPL CALC-MCNC: 29 MG/DL (ref 5–40)
WBC # BLD AUTO: 8.46 10*3/MM3 (ref 3.4–10.8)
WBC #/AREA URNS HPF: NORMAL /HPF (ref 0–5)

## 2021-08-03 DIAGNOSIS — R79.89 ELEVATED LFTS: Primary | ICD-10-CM

## 2021-08-10 DIAGNOSIS — E04.9 ENLARGED THYROID: ICD-10-CM

## 2021-08-10 DIAGNOSIS — R05.9 COUGH: ICD-10-CM

## 2021-11-15 ENCOUNTER — PATIENT MESSAGE (OUTPATIENT)
Dept: FAMILY MEDICINE CLINIC | Facility: CLINIC | Age: 21
End: 2021-11-15

## 2021-11-15 NOTE — TELEPHONE ENCOUNTER
From: Rik Rubin  To: Emily Wright MD  Sent: 11/15/2021 3:26 PM EST  Subject: Albuterol Inhaler    Hey Rik Olsen wanted me to check with you. His rescue inhaler is  and he was wanting to get a new one to have on him incase he needed it. Can you get a prescription for him out to the [facility] at [phone], in [facility] [place]? I called to see if he had one stored there but they didn't have one for him. I also checked on [facility] but will send you those msgs separately the different people.   Please let me know when or if you can get that sent in for him. It is the ProAir HFA inhaler.     Thanks so much and Happy Thanksgiving!  Judy

## 2021-11-16 NOTE — TELEPHONE ENCOUNTER
Rx Refill Note  Requested Prescriptions     Pending Prescriptions Disp Refills   • albuterol sulfate  (90 Base) MCG/ACT inhaler 18 g 1     Sig: Inhale 2 puffs Every 4 (Four) Hours As Needed for Wheezing.      Last office visit with prescribing clinician: 7/30/2021      Next office visit with prescribing clinician: 8/2/2022            Radha Romero MA  11/16/21, 11:26 EST

## 2021-11-17 RX ORDER — ALBUTEROL SULFATE 90 UG/1
2 AEROSOL, METERED RESPIRATORY (INHALATION) EVERY 4 HOURS PRN
Qty: 18 G | Refills: 1 | Status: SHIPPED | OUTPATIENT
Start: 2021-11-17

## 2021-11-19 RX ORDER — BENZONATATE 200 MG/1
200 CAPSULE ORAL 3 TIMES DAILY PRN
Qty: 30 CAPSULE | Refills: 0 | Status: SHIPPED | OUTPATIENT
Start: 2021-11-19

## 2021-11-19 RX ORDER — MONTELUKAST SODIUM 10 MG/1
10 TABLET ORAL NIGHTLY
Qty: 30 TABLET | Refills: 0 | Status: SHIPPED | OUTPATIENT
Start: 2021-11-19

## 2021-11-23 NOTE — TELEPHONE ENCOUNTER
Rx Refill Note  Requested Prescriptions     Pending Prescriptions Disp Refills   • fluticasone-salmeterol (Wixela Inhub) 250-50 MCG/DOSE DISKUS 60 each 5     Sig: Inhale 2 puffs 2 (Two) Times a Day.      Last office visit with prescribing clinician: 7/30/2021      Next office visit with prescribing clinician: 8/2/2022            Radha Romero MA  11/23/21, 14:03 EST

## 2025-08-13 ENCOUNTER — OFFICE VISIT (OUTPATIENT)
Dept: FAMILY MEDICINE CLINIC | Facility: CLINIC | Age: 25
End: 2025-08-13

## 2025-08-13 VITALS
HEART RATE: 113 BPM | OXYGEN SATURATION: 94 % | DIASTOLIC BLOOD PRESSURE: 76 MMHG | WEIGHT: 252.1 LBS | SYSTOLIC BLOOD PRESSURE: 126 MMHG | HEIGHT: 69 IN | BODY MASS INDEX: 37.34 KG/M2

## 2025-08-13 DIAGNOSIS — E04.9 ENLARGED THYROID: ICD-10-CM

## 2025-08-13 DIAGNOSIS — R74.8 ELEVATED LIVER ENZYMES: ICD-10-CM

## 2025-08-13 DIAGNOSIS — Z00.00 WELL ADULT EXAM: Primary | ICD-10-CM

## 2025-08-13 PROCEDURE — 99385 PREV VISIT NEW AGE 18-39: CPT | Performed by: INTERNAL MEDICINE

## 2025-08-13 RX ORDER — LEVOCETIRIZINE DIHYDROCHLORIDE 5 MG/1
5 TABLET, FILM COATED ORAL EVERY EVENING
COMMUNITY